# Patient Record
Sex: MALE | Race: WHITE | URBAN - NONMETROPOLITAN AREA
[De-identification: names, ages, dates, MRNs, and addresses within clinical notes are randomized per-mention and may not be internally consistent; named-entity substitution may affect disease eponyms.]

---

## 2020-03-03 ENCOUNTER — NEW PATIENT (OUTPATIENT)
Dept: URBAN - NONMETROPOLITAN AREA CLINIC 7 | Facility: CLINIC | Age: 75
End: 2020-03-03
Payer: MEDICARE

## 2020-03-03 DIAGNOSIS — H25.13 AGE-RELATED NUCLEAR CATARACT, BILATERAL: Primary | ICD-10-CM

## 2020-03-03 DIAGNOSIS — H11.153 PINGUECULA, BILATERAL: ICD-10-CM

## 2020-03-03 DIAGNOSIS — H04.123 TEAR FILM INSUFFICIENCY OF BILATERAL LACRIMAL GLANDS: ICD-10-CM

## 2020-03-03 DIAGNOSIS — H43.813 VITREOUS DEGENERATION, BILATERAL: ICD-10-CM

## 2020-03-03 DIAGNOSIS — H35.361 DRUSEN (DEGENERATIVE) OF MACULA, RIGHT EYE: ICD-10-CM

## 2020-03-03 PROCEDURE — 99204 OFFICE O/P NEW MOD 45 MIN: CPT | Performed by: OPTOMETRIST

## 2020-03-03 PROCEDURE — 92134 CPTRZ OPH DX IMG PST SGM RTA: CPT | Performed by: OPTOMETRIST

## 2020-03-03 ASSESSMENT — INTRAOCULAR PRESSURE
OS: 17
OD: 17

## 2020-03-03 ASSESSMENT — VISUAL ACUITY
OD: 20/20
OS: 20/30

## 2020-03-03 ASSESSMENT — KERATOMETRY
OD: 44.62
OS: 44.85

## 2022-05-16 ENCOUNTER — OFFICE VISIT (OUTPATIENT)
Dept: URBAN - METROPOLITAN AREA CLINIC 59 | Facility: CLINIC | Age: 77
End: 2022-05-16
Payer: COMMERCIAL

## 2022-05-16 DIAGNOSIS — H52.4 PRESBYOPIA: Primary | ICD-10-CM

## 2022-05-16 DIAGNOSIS — H25.13 AGE-RELATED NUCLEAR CATARACT, BILATERAL: ICD-10-CM

## 2022-05-16 DIAGNOSIS — H43.813 VITREOUS DEGENERATION, BILATERAL: ICD-10-CM

## 2022-05-16 PROCEDURE — 92014 COMPRE OPH EXAM EST PT 1/>: CPT | Performed by: OPTOMETRIST

## 2022-05-16 ASSESSMENT — VISUAL ACUITY
OD: 20/20
OS: 20/30

## 2022-05-16 ASSESSMENT — INTRAOCULAR PRESSURE
OS: 14
OD: 16

## 2022-05-16 NOTE — IMPRESSION/PLAN
Impression: Vitreous degeneration, bilateral: H43.813. Plan: Stable. Symptoms of retinal detachment or tears were discussed in detail. If patient notices any symptoms discussed, contact office ASAP.

## 2022-05-16 NOTE — IMPRESSION/PLAN
Impression: Presbyopia: H52.4. Plan: Patient happy with OTC readers. New glasses Rx was given today. 
Amblyopia OS-discussed

## 2023-04-24 ENCOUNTER — OFFICE VISIT (OUTPATIENT)
Dept: URBAN - METROPOLITAN AREA CLINIC 59 | Facility: CLINIC | Age: 78
End: 2023-04-24
Payer: MEDICARE

## 2023-04-24 DIAGNOSIS — H43.813 VITREOUS DEGENERATION, BILATERAL: ICD-10-CM

## 2023-04-24 DIAGNOSIS — H25.13 AGE-RELATED NUCLEAR CATARACT, BILATERAL: Primary | ICD-10-CM

## 2023-04-24 DIAGNOSIS — H53.002 UNSPECIFIED AMBLYOPIA, LEFT EYE: ICD-10-CM

## 2023-04-24 DIAGNOSIS — H53.032 STRABISMIC AMBLYOPIA, LEFT EYE: ICD-10-CM

## 2023-04-24 DIAGNOSIS — H52.4 PRESBYOPIA: ICD-10-CM

## 2023-04-24 PROCEDURE — 99213 OFFICE O/P EST LOW 20 MIN: CPT | Performed by: OPTOMETRIST

## 2023-04-24 ASSESSMENT — INTRAOCULAR PRESSURE
OD: 21
OS: 21

## 2023-04-24 NOTE — IMPRESSION/PLAN
Impression: Vitreous degeneration, bilateral: H43.813. Plan: Symptoms of retinal detachment or tears were discussed in detail. If patient notices any symptoms discussed, contact office ASAP. Stable, will continue to monitor.

## 2023-04-24 NOTE — IMPRESSION/PLAN
Impression: Presbyopia: H52.4. Plan: Patient happy with OTC readers. New glasses Rx was not given today. 
Amblyopia OS-discussed

## 2024-05-02 ENCOUNTER — OFFICE VISIT (OUTPATIENT)
Dept: URBAN - METROPOLITAN AREA CLINIC 59 | Facility: CLINIC | Age: 79
End: 2024-05-02
Payer: MEDICARE

## 2024-05-02 DIAGNOSIS — H25.13 AGE-RELATED NUCLEAR CATARACT, BILATERAL: Primary | ICD-10-CM

## 2024-05-02 DIAGNOSIS — H43.813 VITREOUS DEGENERATION, BILATERAL: ICD-10-CM

## 2024-05-02 DIAGNOSIS — H53.002 UNSPECIFIED AMBLYOPIA, LEFT EYE: ICD-10-CM

## 2024-05-02 PROCEDURE — 92014 COMPRE OPH EXAM EST PT 1/>: CPT | Performed by: OPTOMETRIST

## 2024-05-02 ASSESSMENT — INTRAOCULAR PRESSURE
OS: 14
OD: 16

## 2025-05-05 ENCOUNTER — OFFICE VISIT (OUTPATIENT)
Dept: URBAN - METROPOLITAN AREA CLINIC 59 | Facility: CLINIC | Age: 80
End: 2025-05-05
Payer: MEDICARE

## 2025-05-05 DIAGNOSIS — H11.153 PINGUECULA, BILATERAL: ICD-10-CM

## 2025-05-05 DIAGNOSIS — H43.813 VITREOUS DEGENERATION, BILATERAL: ICD-10-CM

## 2025-05-05 DIAGNOSIS — H25.13 AGE-RELATED NUCLEAR CATARACT, BILATERAL: Primary | ICD-10-CM

## 2025-05-05 DIAGNOSIS — H53.002 UNSPECIFIED AMBLYOPIA, LEFT EYE: ICD-10-CM

## 2025-05-05 PROCEDURE — 92014 COMPRE OPH EXAM EST PT 1/>: CPT | Performed by: OPTOMETRIST

## 2025-05-05 ASSESSMENT — INTRAOCULAR PRESSURE
OS: 20
OD: 20